# Patient Record
(demographics unavailable — no encounter records)

---

## 2024-10-23 NOTE — PHYSICAL EXAM
[Alert] : alert [No Acute Distress] : no acute distress [Normocephalic] : normocephalic [Anterior [place] Closed] : anterior fontanelle closed [Red Reflex Bilateral] : red reflex bilateral [PERRL] : PERRL [Normally Placed Ears] : normally placed ears [Auricles Well Formed] : auricles well formed [Clear Tympanic membranes with present light reflex and bony landmarks] : clear tympanic membranes with present light reflex and bony landmarks [No Discharge] : no discharge [Nares Patent] : nares patent [Palate Intact] : palate intact [Uvula Midline] : uvula midline [Tooth Eruption] : tooth eruption  [Supple, full passive range of motion] : supple, full passive range of motion [No Palpable Masses] : no palpable masses [Symmetric Chest Rise] : symmetric chest rise [Clear to Auscultation Bilaterally] : clear to auscultation bilaterally [Regular Rate and Rhythm] : regular rate and rhythm [S1, S2 present] : S1, S2 present [No Murmurs] : no murmurs [+2 Femoral Pulses] : +2 femoral pulses [Soft] : soft [NonTender] : non tender [Non Distended] : non distended [Normoactive Bowel Sounds] : normoactive bowel sounds [No Hepatomegaly] : no hepatomegaly [No Splenomegaly] : no splenomegaly [Central Urethral Opening] : central urethral opening [Testicles Descended Bilaterally] : testicles descended bilaterally [Patent] : patent [Normally Placed] : normally placed [No Abnormal Lymph Nodes Palpated] : no abnormal lymph nodes palpated [No Clavicular Crepitus] : no clavicular crepitus [Symmetric Buttocks Creases] : symmetric buttocks creases [No Spinal Dimple] : no spinal dimple [NoTuft of Hair] : no tuft of hair [Cranial Nerves Grossly Intact] : cranial nerves grossly intact [No Rash or Lesions] : no rash or lesions [FreeTextEntry5] : RR++ LR= [FreeTextEntry6] : nl [de-identified] : nl

## 2024-10-23 NOTE — HISTORY OF PRESENT ILLNESS
[Normal] : Normal [No] : No cigarette smoke exposure [Water heater temperature set at <120 degrees F] : Water heater temperature set at <120 degrees F [Car seat in back seat] : Car seat in back seat [Smoke Detectors] : Smoke detectors [Carbon Monoxide Detectors] : Carbon monoxide detectors [At risk for exposure to TB] : Not at risk for exposure to Tuberculosis [FreeTextEntry1] : 2 year old. Has words, sentences.  Interacts well, good eye contact, social nl. Imaginative play.  Walks runs well.  Eats well Sleeps well

## 2024-10-23 NOTE — PHYSICAL EXAM
[Alert] : alert [No Acute Distress] : no acute distress [Normocephalic] : normocephalic [Anterior [place] Closed] : anterior fontanelle closed [Red Reflex Bilateral] : red reflex bilateral [PERRL] : PERRL [Normally Placed Ears] : normally placed ears [Auricles Well Formed] : auricles well formed [Clear Tympanic membranes with present light reflex and bony landmarks] : clear tympanic membranes with present light reflex and bony landmarks [No Discharge] : no discharge [Nares Patent] : nares patent [Palate Intact] : palate intact [Uvula Midline] : uvula midline [Tooth Eruption] : tooth eruption  [Supple, full passive range of motion] : supple, full passive range of motion [No Palpable Masses] : no palpable masses [Symmetric Chest Rise] : symmetric chest rise [Clear to Auscultation Bilaterally] : clear to auscultation bilaterally [Regular Rate and Rhythm] : regular rate and rhythm [S1, S2 present] : S1, S2 present [No Murmurs] : no murmurs [+2 Femoral Pulses] : +2 femoral pulses [Soft] : soft [NonTender] : non tender [Non Distended] : non distended [Normoactive Bowel Sounds] : normoactive bowel sounds [No Hepatomegaly] : no hepatomegaly [No Splenomegaly] : no splenomegaly [Central Urethral Opening] : central urethral opening [Testicles Descended Bilaterally] : testicles descended bilaterally [Patent] : patent [Normally Placed] : normally placed [No Abnormal Lymph Nodes Palpated] : no abnormal lymph nodes palpated [No Clavicular Crepitus] : no clavicular crepitus [Symmetric Buttocks Creases] : symmetric buttocks creases [No Spinal Dimple] : no spinal dimple [NoTuft of Hair] : no tuft of hair [Cranial Nerves Grossly Intact] : cranial nerves grossly intact [No Rash or Lesions] : no rash or lesions [FreeTextEntry5] : RR++ LR= [FreeTextEntry6] : nl [de-identified] : nl

## 2024-11-25 NOTE — DISCUSSION/SUMMARY
[FreeTextEntry1] : Bilat OM, just treated with augmentin cefdinir To ENT if not better Rtn in 10 days

## 2024-11-25 NOTE — HISTORY OF PRESENT ILLNESS
[FreeTextEntry6] : Parents:  Had a cold, ear infcn,  still with a cold.  Had amoxil in the past.  Was on augmentin last OM. Just ended.  STill with a runny nose.  NICHOLE

## 2024-12-26 NOTE — DISCUSSION/SUMMARY
[FreeTextEntry1] : R TM nl  L TM injected TM and some whitel material lower 10% of the eardrum.   WW approach - give un reconstit antbiotic only if needs to start over weekend, otherwise RTO if needed. Amoxicillin given. Start for ear pain or fever .

## 2024-12-26 NOTE — PHYSICAL EXAM
[NL] : warm, clear [Clear Rhinorrhea] : clear rhinorrhea [FreeTextEntry1] : NAD [FreeTextEntry3] : R TM nl.  L TM with small amount of white material at base of TM, mild injection.  [FreeTextEntry7] : clear

## 2024-12-26 NOTE — END OF VISIT
[Time Spent: ___ minutes] : I have spent [unfilled] minutes of time on the encounter which excludes teaching and separately reported services.
[Time Spent: ___ minutes] : I have spent [unfilled] minutes of time on the encounter which excludes teaching and separately reported services.
General

## 2024-12-26 NOTE — HISTORY OF PRESENT ILLNESS
[FreeTextEntry6] : Ear fluid recheck. no meds Woke up a few times at nights, no ear complaints, no fever.  To [place] this weekend.   New cold now. Runny nose, no fever, rare cough.

## 2025-01-13 NOTE — HISTORY OF PRESENT ILLNESS
[FreeTextEntry6] :  Parent requested telephone visit.   Verbal consent was obtained from the parent for this visit. Patient location [facility].  MD location:  medical office, [facility].    Spoke to mother.   Question - mom has appt with ENT, not sure if needs to go. Had a few draining ears.   Advised to go. 2/6/25 - I will check note next day to see what he says.   8 mins

## 2025-02-06 NOTE — CONSULT LETTER
[Dear  ___] : Dear  [unfilled], [FreeTextEntry2] : Dear Dr. Gamino [FreeTextEntry3] :  Ascencion Hess MD, PhD  Chief, Division of Laryngology  Department of Otolaryngology  [facility]  Pediatric Otolaryngology, [facility]   of Otolaryngology  [facility]

## 2025-02-06 NOTE — DATA REVIEWED
[FreeTextEntry1] : Audiogram ordered to assess for sensorineural +/- conductive hearing loss Results: normal hearing AU

## 2025-02-06 NOTE — HISTORY OF PRESENT ILLNESS
[de-identified] : 2yM with history of frequent ear infections.  Last infection was about two months ago, resolved without abx Usually when he gets an ear infection, requires abx Had at least three ear infection in the last six months. Had fluid in between Currently mom denies Patient denies otalgia, otorrhea, ear infections, hearing loss Had audio done at the pcp office, showed fluid  No issues with breathing, no snoring or apnea Mom is not concerned with speech.  Wears glasses, minimal imbalance FT, uncomplicated delivery with uncomplicated pregnancy Passed [facility] AU No cyanosis, no ETT intubation, no home oxygen requirement, no NICU stay  No epistaxis, mild snoring, no apneas More than 10abx for AOM

## 2025-02-19 NOTE — HISTORY OF PRESENT ILLNESS
[FreeTextEntry6] : FU otitits bilat, last day on antibiotic. Mother say Dr Hess ENT, scheduled for PE tubes in 5/25. wants ear recheck.  child well

## 2025-02-19 NOTE — DISCUSSION/SUMMARY
[FreeTextEntry1] : Child now well, with bilat serous OM.  Finish last day antibiotic tomorrow.  Tympanogram type B bilat.  RTO next week ear recheck if mom concerned.  Advised either move BMT surgery sooner or likely may be better not to do in spring as usually OMs less frequent over spring, summer. Reviewed note by Dr walker, said consider BMT, rtn 6 mos.

## 2025-02-27 NOTE — DISCUSSION/SUMMARY
[FreeTextEntry1] : Ears now nl.  Tympanogram - type A  If no ear infections may not need tubes, disc with mother, she may go forward as had a lot of OMs this season.  Come in for pre op visit

## 2025-02-27 NOTE — PHYSICAL EXAM
[NL] : warm, clear [FreeTextEntry1] : happy active NAD [FreeTextEntry3] : L TM 2 vessels on surface , otherwise nl.    R TM nl. No fluid R TM nl  Tymp

## 2025-02-27 NOTE — HISTORY OF PRESENT ILLNESS
[FreeTextEntry6] : mother For ENT surgery 3/12/25, moved [place] with [place]. Hess.  Recheck ears  Had freq OMs this season.

## 2025-03-06 NOTE — DISCUSSION/SUMMARY
[FreeTextEntry1] : [place] acute otitis not better on cefdinir.  Change to augmentin ES  Hopefully better for next week ENT surgery as needs the PE tubes RTO in 4 d pre op

## 2025-03-06 NOTE — PHYSICAL EXAM
[Clear Rhinorrhea] : clear rhinorrhea [NL] : warm, clear [FreeTextEntry1] : NAD tired looking , alert [FreeTextEntry3] : TM pus bilateral, full, injected TM.  L TM bulging out inferiorly.

## 2025-03-10 NOTE — HISTORY OF PRESENT ILLNESS
[Sudden Death] : sudden death [Fever] : no fever [Chills] : no chills [Runny Nose] : no runny nose [Earache] : no earache [Headache] : no headache [Sore Throat] : no sore throat [Cough] : no cough [Appetite] : no decrease in appetite [Nausea] : no nausea [Vomiting] : no vomiting [Abdominal Pain] : no abdominal pain [Diarrhea] : no diarrhea [Easy Bruising] : no easy bruising [Rash] : no rash [Dysuria] : no dysuria [Urinary Frequency] : no urinary frequency [Prior Anesthesia] : No prior anesthesia [Prev Anesthesia Reaction] : no previous anesthesia reaction [Diabetes] : no diabetes [Pulmonary Disease] : no pulmonary disease [Renal Disease] : no renal disease [GI Disease] : no gastrointestinal disease [Sleep Apnea] : no sleep apnea [Transfusion Reaction] : no transfusion reaction [Impaired Immunity] : no impaired immunity [Frequent use of NSAIDs] : no use of NSAIDs [Anesthesia Reaction] : no anesthesia reaction [Clotting Disorder] : no clotting disorder [Bleeding Disorder] : no bleeding disorder [de-identified] : Marvin huizar brain aneurysm age 30 yrs [FreeTextEntry1] : Child with recurrent ear infections, scheduled for PE tubes on 3/12/25.  Treated with cefdinir, did not improve, changed to Augmentin ES and now better by parent report. No fever, no earache.

## 2025-03-10 NOTE — PHYSICAL EXAM
[General Appearance - Alert] : alert [General Appearance - Well-Appearing] : well appearing [General Appearance - In No Acute Distress] : in no acute distress [Appearance Of Head] : the head was normocephalic [Evidence Of Head Injury] : threre was no evidence of injury [Facies] : no facial abnormalities were observed [Sclera] : the conjunctiva were normal [Outer Ear] : the ears and nose were normal in appearance [Examination Of The Oral Cavity] : mucous membranes were moist and pink [Normal Appearance] : was normal in appearance [Neck Supple] : was supple [Respiration, Rhythm And Depth] : normal respiratory rhythm and effort [Auscultation Breath Sounds / Voice Sounds] : clear bilateral breath sounds [Heart Rate And Rhythm] : heart rate and rhythm were normal [Heart Sounds] : normal S1 and S2 [Murmurs] : no murmurs [Bowel Sounds] : normal bowel sounds [Abdomen Soft] : soft [Abdomen Tenderness] : non-tender [Abdominal Distention] : nondistended [] : no hepato-splenomegaly [Atraumatic] : the extremities were atraumatic [FROM Extremities] : there was normal movement of all extremities [Normal Joints] : there was no swelling or deformity of the joints [Normal Motor Tone] : the muscle tone was normal [Involuntary Movements] : no involuntary movements were seen [No Visual Abnormalities] : no visible abnormailities [Motor Tone] : muscle strength and tone were normal [Generalized Lymph Node Enlargement] : no lymphadenopathy [Normal] : normal texture and mobility [Penis Abnormality] : the penis was normal [Scrotum] : the scrotum was normal [Testes Cryptorchism] : both testicles were descended [Testes Mass (___cm)] : there were no testicular masses [FreeTextEntry1] : resolving bilat Otitis media [Enlarged Diffusely] : was not enlarged [Abnormal Color] : normal color and pigmentation [Skin Lesions 1] : no skin lesions were observed [Skin Turgor Decreased] : normal skin turgor

## 2025-04-03 NOTE — HISTORY OF PRESENT ILLNESS
[de-identified] : 2 year old boy with h/o Chronic otitis media with effusion, bilateral hearing loss presents for follow-up s/p Bilateral myringotomy and tympanostomy tube insertions 3/12/2025 Reports doing well since surgery.  No s/s of otalgia, bleeding, otorrhea, recent fevers or ear infections.  No snoring or nasal congestion issues.  No throat/tonsil infections.

## 2025-04-03 NOTE — CONSULT LETTER
[Dear  ___] : Dear  [unfilled], [Please see my note below.] : Please see my note below. [Consult Closing:] : Thank you very much for allowing me to participate in the care of this patient.  If you have any questions, please do not hesitate to contact me. [Sincerely,] : Sincerely, [Courtesy Letter:] : I had the pleasure of seeing your patient, [unfilled], in my office today. [FreeTextEntry2] : Dr. Danika Gamino [FreeTextEntry3] : Ascencion Hess MD, PhD Chief, Division of Laryngology Department of Otolaryngology [facility] Pediatric Otolaryngology, [facility]  of Otolaryngology [facility]

## 2025-04-03 NOTE — ADDENDUM
[FreeTextEntry1] :   Documented by Jeffery Loredo acting as scribe for Dr. Hess on 04/03/2025. All Medical record entries made by the Scribe were at my, Dr. Hess, direction and personally dictated by me on 04/03/2025 . I have reviewed the chart and agree that the record accurately reflects my personal performance of the history, physical exam, assessment and plan. I have also personally directed, reviewed, and agreed with the discharge instructions.

## 2025-04-03 NOTE — REVIEW OF SYSTEMS
[de-identified] : as per HPI  [de-identified] : as per HPI  [de-identified] : as per HPI  [FreeTextEntry2] : as per HPI

## 2025-04-03 NOTE — REASON FOR VISIT
[Post-Operative Visit] : a post-operative visit for [Mother] : mother [FreeTextEntry2] : h/o Chronic otitis media with effusion, bilateral hearing loss.

## 2025-04-24 NOTE — DISCUSSION/SUMMARY
[Normal Growth] : growth [Normal Development] : development [None] : No known medical problems [No Elimination Concerns] : elimination [No Feeding Concerns] : feeding [No Skin Concerns] : skin [Normal Sleep Pattern] : sleep [No Medications] : ~He/She~ is not on any medications [Parent/Guardian] : parent/guardian [FreeTextEntry1] : Well 30 mos old. PE tubes, glasses.  MOving to [place] in a few mos.  RTO in OCt, labs then.   Safety, antic guidance in detail. Childproofing, put cleaning liquids and laundry pods up high, Do not rely on child proof locks, put dangerous products out of reach.  Keep all medicines and vitamins where children cannot reach them.  Do not put cleaning liquids in drink bottles. Choking prevention in detail, no hot dogs, whole nuts, popcorn  Mash round fruits and vegs and other foods. Take CPR class.  CS or booster seat use. Car seat in back seat facing backwards until age 2 yrs. Tap water for fluoride.  No  food or drink after brush teeth each night. Safe crib, remove mobiles etc. Have smoke detectors and carbon monoxide detectors in the home and check them and change batteries regularly. Fire extinguisher in the kitchen. Healthy eating and exercise.  Family meals make happier kids.  5210 healthy eating advised. Pool, water safety.  SD CO FE

## 2025-04-24 NOTE — PHYSICAL EXAM
[Alert] : alert [No Acute Distress] : no acute distress [Playful] : playful [Normocephalic] : normocephalic [Conjunctivae with no discharge] : conjunctivae with no discharge [PERRL] : PERRL [EOMI Bilateral] : EOMI bilateral [Auricles Well Formed] : auricles well formed [Clear Tympanic membranes with present light reflex and bony landmarks] : clear tympanic membranes with present light reflex and bony landmarks [No Discharge] : no discharge [Nares Patent] : nares patent [Pink Nasal Mucosa] : pink nasal mucosa [Palate Intact] : palate intact [Uvula Midline] : uvula midline [Nonerythematous Oropharynx] : nonerythematous oropharynx [No Caries] : no caries [Trachea Midline] : trachea midline [Supple, full passive range of motion] : supple, full passive range of motion [No Palpable Masses] : no palpable masses [Symmetric Chest Rise] : symmetric chest rise [Clear to Auscultation Bilaterally] : clear to auscultation bilaterally [Normoactive Precordium] : normoactive precordium [Regular Rate and Rhythm] : regular rate and rhythm [Normal S1, S2 present] : normal S1, S2 present [No Murmurs] : no murmurs [+2 Femoral Pulses] : +2 femoral pulses [Soft] : soft [NonTender] : non tender [Non Distended] : non distended [Normoactive Bowel Sounds] : normoactive bowel sounds [No Hepatomegaly] : no hepatomegaly [No Splenomegaly] : no splenomegaly [Dawit 1] : Dawit 1 [Circumcised] : circumcised [Central Urethral Opening] : central urethral opening [Testicles Descended Bilaterally] : testicles descended bilaterally [Patent] : patent [Normally Placed] : normally placed [No Abnormal Lymph Nodes Palpated] : no abnormal lymph nodes palpated [Symmetric Buttocks Creases] : symmetric buttocks creases [Symmetric Hip Rotation] : symmetric hip rotation [No Gait Asymmetry] : no gait asymmetry [No pain or deformities with palpation of bone, muscles, joints] : no pain or deformities with palpation of bone, muscles, joints [Normal Muscle Tone] : normal muscle tone [No Spinal Dimple] : no spinal dimple [NoTuft of Hair] : no tuft of hair [Straight] : straight [+2 Patella DTR] : +2 patella DTR [Cranial Nerves Grossly Intact] : cranial nerves grossly intact [No Rash or Lesions] : no rash or lesions [FreeTextEntry1] : NAD. good interaction [FreeTextEntry5] : RR++ LR=.  Glasses [FreeTextEntry3] : PE tubes [FreeTextEntry8] : nl [FreeTextEntry9] : nl [FreeTextEntry6] : nl, testes desc bilat [de-identified] : nl

## 2025-04-24 NOTE — HISTORY OF PRESENT ILLNESS
[Normal] : Normal [No] : No cigarette smoke exposure [Water heater temperature set at <120 degrees F] : Water heater temperature set at <120 degrees F [Car seat in back seat] : Car seat in back seat [Carbon Monoxide Detectors] : Carbon monoxide detectors [Smoke Detectors] : Smoke detectors [Supervised play near cars and streets] : Supervised play near cars and streets [NO] : No [FreeTextEntry1] : 2.5 yr old, mother here.   Has PE tubes now.  Glasses.   Moving to [facility] [place].

## 2025-04-24 NOTE — PHYSICAL EXAM
[Alert] : alert [No Acute Distress] : no acute distress [Playful] : playful [Normocephalic] : normocephalic [Conjunctivae with no discharge] : conjunctivae with no discharge [PERRL] : PERRL [EOMI Bilateral] : EOMI bilateral [Auricles Well Formed] : auricles well formed [Clear Tympanic membranes with present light reflex and bony landmarks] : clear tympanic membranes with present light reflex and bony landmarks [No Discharge] : no discharge [Nares Patent] : nares patent [Pink Nasal Mucosa] : pink nasal mucosa [Palate Intact] : palate intact [Uvula Midline] : uvula midline [Nonerythematous Oropharynx] : nonerythematous oropharynx [No Caries] : no caries [Trachea Midline] : trachea midline [Supple, full passive range of motion] : supple, full passive range of motion [No Palpable Masses] : no palpable masses [Symmetric Chest Rise] : symmetric chest rise [Clear to Auscultation Bilaterally] : clear to auscultation bilaterally [Normoactive Precordium] : normoactive precordium [Regular Rate and Rhythm] : regular rate and rhythm [Normal S1, S2 present] : normal S1, S2 present [No Murmurs] : no murmurs [+2 Femoral Pulses] : +2 femoral pulses [Soft] : soft [NonTender] : non tender [Non Distended] : non distended [Normoactive Bowel Sounds] : normoactive bowel sounds [No Hepatomegaly] : no hepatomegaly [No Splenomegaly] : no splenomegaly [Dawit 1] : Dawit 1 [Circumcised] : circumcised [Central Urethral Opening] : central urethral opening [Testicles Descended Bilaterally] : testicles descended bilaterally [Patent] : patent [Normally Placed] : normally placed [No Abnormal Lymph Nodes Palpated] : no abnormal lymph nodes palpated [Symmetric Buttocks Creases] : symmetric buttocks creases [Symmetric Hip Rotation] : symmetric hip rotation [No Gait Asymmetry] : no gait asymmetry [No pain or deformities with palpation of bone, muscles, joints] : no pain or deformities with palpation of bone, muscles, joints [Normal Muscle Tone] : normal muscle tone [No Spinal Dimple] : no spinal dimple [NoTuft of Hair] : no tuft of hair [Straight] : straight [+2 Patella DTR] : +2 patella DTR [Cranial Nerves Grossly Intact] : cranial nerves grossly intact [No Rash or Lesions] : no rash or lesions [FreeTextEntry1] : NAD. good interaction [FreeTextEntry5] : RR++ LR=.  Glasses [FreeTextEntry3] : PE tubes [FreeTextEntry8] : nl [FreeTextEntry9] : nl [FreeTextEntry6] : nl, testes desc bilat [de-identified] : nl

## 2025-04-24 NOTE — HISTORY OF PRESENT ILLNESS
[Normal] : Normal [No] : No cigarette smoke exposure [Water heater temperature set at <120 degrees F] : Water heater temperature set at <120 degrees F [Car seat in back seat] : Car seat in back seat [Carbon Monoxide Detectors] : Carbon monoxide detectors [Smoke Detectors] : Smoke detectors [Supervised play near cars and streets] : Supervised play near cars and streets [NO] : No [FreeTextEntry1] : 2.5 yr old, mother here.   Has PE tubes now.  Glasses.   Moving to [facility] [place].